# Patient Record
Sex: MALE | Race: WHITE
[De-identification: names, ages, dates, MRNs, and addresses within clinical notes are randomized per-mention and may not be internally consistent; named-entity substitution may affect disease eponyms.]

---

## 2017-02-23 NOTE — CONS
DATE OF CONSULTATION:  02/23/2017



CONSULTATION/NEW PATIENT EVALUATION



HISTORY OF PRESENT ILLNESS/SLEEP-WAKE EVALUATION:   31-year-old 

gentleman who  has been evaluated at the sleep center for possible 

obstructive sleep apnea-hypopnea syndrome.  



SLEEP SCHEDULE:  Patient's usual sleep schedule on working days is 

from midnight until around 7:30 a.m. on weekends he sleeps from about 

2:00 a.m. until 8 or 9:00 a.m. gets out of bed around 10:00 a.m.  



FALLING ASLEEP:   No problem with falling asleep.  There is a TV in 

bedroom.  



DURING SLEEP: He sleeps on the side position with quite loud snoring, 

witnessed episodes of stopped breathing during sleep. Patient wakes up 

from sleep up to 4 times with choking, gasping for air, episodes of 

sleepwalking, nocturia, positive history of sleepwalking, episodes of 

sleepwalking, occasional episodes of sleep walking, the patient has 

had sleepwalking for about one year.      



DURING THE DAY/WAKE STATE:  During the day patient feels sleepiness. 

[place] Sleepiness Scale significantly increased to 12. He wakes up 

tired, has difficulties to pay attention, falling asleep during the 

day.  He has problems with memory, irritability, depression and 

anxiety. He may take one nap a day around 1:00 p.m. until 3:00 p.m.  

He may see dreams during naps.  



PAST MEDICAL HISTORY: Positive for hypertension and anxiety. Back pain. 



PAST SURGICAL HISTORY: Hernia repair. 



MEDICATIONS: 

1. Norco.

2. Lisinopril.

3. Atenolol.

4. Xanax.

5. Simvastatin.

6. Adipex. 



SOCIAL HISTORY: Positive for smoking about 1 pack a day for 13 years. 

Alcohol consumption none.  



REVIEW OF SYSTEMS: Multiple awakenings from sleep, sleepiness during 

the day, increasing weight.  

No fevers. No double vision. No recent chest pain. No shortness of 

breath. No abdominal pain. No bleeding episodes. No blood in urine. No 

seizure episodes.  



FAMILY HISTORY: Hypertension, heart problems, hyperlipidemia, stroke, 

fibromyalgia, arthritis, asthma, emphysema, snoring, cancer, acid 

reflux, thyroid problems.  



PHYSICAL EXAMINATION:

GENERAL: A 31-year-old [place] gentleman without distress. BP on the 

right arm 162/109. On the left arm is 164/99, HR 74, RR 16. Height 5 

foot 9 weight 289, BMI 42.6. Neck night 19-1/4 inch in circumference. 

Temp is 97.2. Oxygen saturation at room air 94%.  

HEENT: PERRLA, EOMI oropharynx low position of soft palate. 

ABDOMEN: Obese. 

NECK: Supple. No JVD. Thyroid is not palpable. 

LUNGS: Clear to percussion and to auscultation. Good air exchange. No 

wheezing or rhonchi.  

HEART: S1, S2 regular. No murmurs, gallops or rubs. 

ABDOMEN: Soft and nontender. Bowel sounds are present. No organomegaly 

appreciated.  

EXTREMITIES: No clubbing or cyanosis. 

CNS: Awake, alert, and oriented x3. Cranial nerves 2 to 7 intact. 

There is no fasciculation or atrophy noted. No focal deficits 

observed.  



IMPRESSION:

1. Snoring, witnessed episodes of stopped breathing during sleep, low 

position of soft palate, multiple awakenings from sleep, sleepiness 

during the day, obstructive sleep apnea/hypopnea syndrome.  

2. Sleepiness during the day the patient may take naps positive 

history of dreaming during the differential diagnosis would include 

hypersomnia after sleep apnea will be treated.  

3. Obesity. 

4. Hypertension. 

5. Anxiety. 

6. Back pain. 

7. Status post hernia repair. 

8. Patient is a smoker for 13 years. Smoking cessation program. 



PLAN: 

1. Polysomnography for evaluation of patient's breathing during sleep. 

2. CPAP/BiPAP titration if sleep study confirms obstructive sleep 

apnea-hypopnea syndrome.  

3. Preferable position during sleep on the side. 

4. No driving if patient feels any sleepiness. Patient is aware of 

civil and criminal liability for unsafe driving.  

5. I will see patient for follow-up visit to explain results of the 

testing and following plan.  

6. Smoking cessation program. 



Thank you very much for allowing me to participate in the management 

of your patient.  



Sincerely,







Henok Boucher MD, PhD, FAASM.

Diplomat of American Board of Sleep Medicine,

Sleep Medicine Board by American Board of Medical Specialities

American Board of Internal Medicine

Medical Director of [facility]

## 2017-08-18 NOTE — PN
DATE OF SERVICE:  08/17/2017



A 31-year-old gentleman who has been followed in the Sleep Center for treatment 
of severe obstructive sleep apnea-hypopnea syndrome. 



I discussed results of sleep studies with patient in details. Diagnostic sleep 
study showed severe sleep apnea then patient had CPAP titration. I recommended 
CPAP pressure 11 cm of water. 



Patient received his CPAP unit and able to use it without significant problems 
related to the pressure or mask. 



Patient brought his CPAP unit with him. I checked CPAP unit.  CPAP pressures 
are 11 cm of water. Usage is 18 out of 30 nights for more than 4 hours. Patient 
feels much better with machine. He sleeps well. He feels better during the day. 
Apnea-hypopnea index reading from the machine only 2.8. [place] sleepiness 
scale today is only 1. No significant leak; it is only 13 liters per minute, 
which is acceptable.  



MEDICATIONS:  Lisinopril, atenolol, alprazolam, Norco, Adipex, simvastatin, 
prednisone. 



During physical exam, the patient is in no distress. 

VITAL SIGNS:  /105, HR 70, RR 16, weight 287, temp 98.8, oxygen 
saturation on room air 96%.   

HEENT:  PERRLA, EOMI. Evaluation of oropharynx showed extremely low position of 
soft palate. 

NECK:  Supple. No JVD. Thyroid is not palpable. 

LUNGS: Clear to percussion and to auscultation. Good air exchange. No wheezing 
or rhonchi. 

HEART: S1, S2 regular. No murmurs, gallops or rubs. 

ABDOMEN: obese. Soft and nontender. Bowel sounds are present. No organomegaly 
appreciated. 

EXTREMITIES: No cyanosis or clubbing. 

CNS: Awake, alert and oriented x3.  Cranial nerves II through VII intact. There 
is no fasciculation or atrophy noted. No focal deficits observed. 



IMPRESSION: 

1.   Severe obstructive sleep apnea, hypopnea syndrome.  Apnea-hypopnea index 
39.5 with oxygen desaturation to 65.2% on control with CPAP at 11 cm of water. 
Patient demonstrated borderline compliance. Benefiting from treatment. 

2.   Obesity. 

3.   Hypertension. 

4.   Anxiety. 

5.   Back problems. 

6.   Status post kidney repair. 

7.   Smoker for 30-years. 



PLAN:  

1.   Patient will continue to use CPAP equipment every night for the whole 
night. 

2.   Losing weight. 

3.   Sleep hygiene in bed for at least 8 hours. 

4.   No driving if feeling any sleepiness. 

5.   Preferably smoking cessation problem. 



Thank you very much for allowing me to participate in the management of your 
patient. 



Sincerely, 



Brant Boucher MD, PhD, FAASM

Diplomat of American Board of Sleep Medicine, 

Sleep Medicine Board by American Board of Medical Specialties 

American Board of Internal Medicine

Medical Director of [facility] 
[facility]

## 2017-11-16 NOTE — US
EXAMINATION TYPE: US kidneys/renal and bladder

 

DATE OF EXAM: 11/16/2017

 

COMPARISON: US, MRI

 

CLINICAL HISTORY: Low back pain, M54.5; Patient stated has hematuria, left lateral and flank pain rad
iating to left pelvis past couple of days and symptoms diminishing since passing renal stones; dysuri
a

 

EXAM MEASUREMENTS:

 

Right Kidney:  12.4 x 6.4 x 5.7 cm

Left Kidney: 13.1 x 6.0 x 5.4 cm

Post Void Residual Volume:  1.8 mL

 

 

 

Right Kidney: No hydronephrosis or masses seen  

Left Kidney: No hydronephrosis or masses seen  

Bladder: not fully distended; multiple hyperechoic foci (calcifications) noted at left ureterovesicul
ar junction with largest focus = 0.7 x 0.7 x 0.5cm; additional hyperechoic focus noted at prostate ur
ethral level =  1.3 x 1.5 x 0.7cm

**Bilateral Jets seen: Yes

**Normal Post Void Residual: Yes

 

There is no evidence for hydronephrosis at this point in time.  No nephrolithiasis is seen.  No claudia
s are identified.  The urinary bladder is anechoic.  Bilateral ureteral jets are seen.

 

IMPRESSION:

Incompletely obstructing calculi at the distal left ureterovesicular junction with the largest measur
ing 7 mm and no resultant hydronephrosis of either kidney. Additional calculus measuring up to 1.5 cm
 is seen near the prosthetic urethra and could be ureteral or within the prostate gland.

 

A Yellow message has been communicated to Nabeel Calle DO via the [employer] Critical Result
 system on 11/16/2017 1:36 PM, Message ID [id number].

## 2017-11-17 NOTE — XR
Abdomen

 

HISTORY: Kidney stone, N20.0

 

Frontal view of the abdomen on 2 images correlated to ultrasound 11/16/2017

 

Lung bases are clear. No pneumoperitoneum or bowel obstruction. Retained fecal debris present within 
the colon. There are indeterminate calcifications within the pelvis.

 

IMPRESSION: There may be distal ureteral calculus.

## 2019-04-29 ENCOUNTER — HOSPITAL ENCOUNTER (OUTPATIENT)
Dept: HOSPITAL 47 - CATHCVL | Age: 34
End: 2019-04-29
Payer: COMMERCIAL

## 2019-04-29 VITALS — HEART RATE: 91 BPM

## 2019-04-29 VITALS — BODY MASS INDEX: 41.8 KG/M2

## 2019-04-29 VITALS — DIASTOLIC BLOOD PRESSURE: 78 MMHG | SYSTOLIC BLOOD PRESSURE: 138 MMHG

## 2019-04-29 VITALS — TEMPERATURE: 96.2 F | RESPIRATION RATE: 18 BRPM

## 2019-04-29 DIAGNOSIS — R79.89: ICD-10-CM

## 2019-04-29 DIAGNOSIS — Z79.899: ICD-10-CM

## 2019-04-29 DIAGNOSIS — R03.0: ICD-10-CM

## 2019-04-29 DIAGNOSIS — R94.31: Primary | ICD-10-CM

## 2019-04-29 LAB
ANION GAP SERPL CALC-SCNC: 7 MMOL/L
BASOPHILS # BLD AUTO: 0.1 K/UL (ref 0–0.2)
BASOPHILS NFR BLD AUTO: 1 %
BUN SERPL-SCNC: 10 MG/DL (ref 9–20)
CALCIUM SPEC-MCNC: 9.4 MG/DL (ref 8.4–10.2)
CHLORIDE SERPL-SCNC: 107 MMOL/L (ref 98–107)
CO2 SERPL-SCNC: 27 MMOL/L (ref 22–30)
EOSINOPHIL # BLD AUTO: 0.1 K/UL (ref 0–0.7)
EOSINOPHIL NFR BLD AUTO: 2 %
ERYTHROCYTE [DISTWIDTH] IN BLOOD BY AUTOMATED COUNT: 5.16 M/UL (ref 4.3–5.9)
ERYTHROCYTE [DISTWIDTH] IN BLOOD: 13.9 % (ref 11.5–15.5)
GLUCOSE SERPL-MCNC: 97 MG/DL (ref 74–99)
HCT VFR BLD AUTO: 47.4 % (ref 39–53)
HGB BLD-MCNC: 15.8 GM/DL (ref 13–17.5)
LYMPHOCYTES # SPEC AUTO: 2.4 K/UL (ref 1–4.8)
LYMPHOCYTES NFR SPEC AUTO: 25 %
MCH RBC QN AUTO: 30.5 PG (ref 25–35)
MCHC RBC AUTO-ENTMCNC: 33.3 G/DL (ref 31–37)
MCV RBC AUTO: 91.8 FL (ref 80–100)
MONOCYTES # BLD AUTO: 0.6 K/UL (ref 0–1)
MONOCYTES NFR BLD AUTO: 6 %
NEUTROPHILS # BLD AUTO: 6 K/UL (ref 1.3–7.7)
NEUTROPHILS NFR BLD AUTO: 64 %
PLATELET # BLD AUTO: 239 K/UL (ref 150–450)
POTASSIUM SERPL-SCNC: 4.4 MMOL/L (ref 3.5–5.1)
SODIUM SERPL-SCNC: 141 MMOL/L (ref 137–145)
WBC # BLD AUTO: 9.3 K/UL (ref 3.8–10.6)

## 2019-04-29 PROCEDURE — 93458 L HRT ARTERY/VENTRICLE ANGIO: CPT

## 2019-04-29 PROCEDURE — 85025 COMPLETE CBC W/AUTO DIFF WBC: CPT

## 2019-04-29 PROCEDURE — 80048 BASIC METABOLIC PNL TOTAL CA: CPT

## 2019-04-29 PROCEDURE — 76937 US GUIDE VASCULAR ACCESS: CPT

## 2019-04-29 RX ADMIN — MIDAZOLAM HYDROCHLORIDE ONE MG: 1 INJECTION, SOLUTION INTRAMUSCULAR; INTRAVENOUS at 12:26

## 2019-04-29 RX ADMIN — MIDAZOLAM HYDROCHLORIDE ONE MG: 1 INJECTION, SOLUTION INTRAMUSCULAR; INTRAVENOUS at 12:34

## 2019-04-29 NOTE — CC
CARDIAC CATHETERIZATION REPORT



Mr. Bangura is a 33-year-old gentleman who was admitted to the [facility] with

symptoms of chest pain and shortness of breath, uncontrolled hypertension.  Patient had

a significantly elevated blood pressure, which was controlled with the medication and

was treated for pneumonia. EKG showed diffuse ST-T changes suggestive of left

ventricular hypertrophy with a strain pattern, underlying ischemia cannot be entirely

excluded.  Patient had a mild elevation in the troponin which could be secondary to

hypertension.  In view of the significant EKG changes, patient was recommended to have

a cardiac catheterization for definitive diagnosis to rule out any significant

underlying coronary artery disease.



PROCEDURE THE:

Right groin was prepped and draped in the usual manner and the right femoral artery was

entered using Seldinger technique.  Under ultrasound guidance and micropuncture needle,

#6-Polish sheath was placed in.  Selective coronary angiography was then performed in

multiple projections and the left ventriculography was performed in 30-degree SHEARER

projection.  Patient tolerated the procedure well.  Sheath was removed and good

hemostasis was achieved with the use of Angio-Seal.



Moderate sedation was used, sedation time was 25 minutes.



HEMODYNAMICS:

The left ventricular end-diastolic pressure is 12 to 16 mmHg prior to angiography.  No

gradient is noted across the aortic valve.



SELECTIVE CORONARY ANGIOGRAPHY:

Left main coronary artery is normal and patent.  LAD is a good caliber blood vessel and

gives rise to a good size diagonal branch.  LAD and its branches are normal.  There is

a good-sized intermediate branch which is normal.



Circumflex coronary artery gives rise to a small size obtuse marginal branch and it is

nondominant, it is normal.



Right coronary artery gives rise to the posterior descending artery, dominant in

distribution and it is normal.



Left ventriculography reveals a normal left ventricular systolic function with the

ejection fraction more than 65%.



IMPRESSION:

This study reveals normal coronary arteries.  Left ventricular systolic function is

normal.  The patient has electrocardiographic changes are suggestive of mild

hypertrophic cardiomyopathy.  Further evaluation with an MRI may be helpful to rule out

any significant infiltrative or hypertrophic cardiomyopathy and a symmetrical

hypertrophic cardiomyopathy.





MMODL / IJN: [id number] / Job#: [id number]